# Patient Record
Sex: FEMALE | Race: WHITE
[De-identification: names, ages, dates, MRNs, and addresses within clinical notes are randomized per-mention and may not be internally consistent; named-entity substitution may affect disease eponyms.]

---

## 2018-06-16 ENCOUNTER — HEALTH MAINTENANCE LETTER (OUTPATIENT)
Age: 28
End: 2018-06-16

## 2019-09-09 ENCOUNTER — TRANSFERRED RECORDS (OUTPATIENT)
Dept: HEALTH INFORMATION MANAGEMENT | Facility: CLINIC | Age: 29
End: 2019-09-09

## 2019-09-09 ENCOUNTER — MEDICAL CORRESPONDENCE (OUTPATIENT)
Dept: HEALTH INFORMATION MANAGEMENT | Facility: CLINIC | Age: 29
End: 2019-09-09

## 2019-10-25 ENCOUNTER — TELEPHONE (OUTPATIENT)
Dept: MATERNAL FETAL MEDICINE | Facility: CLINIC | Age: 29
End: 2019-10-25

## 2019-10-25 NOTE — TELEPHONE ENCOUNTER
MFM has attempted to reach patient x4 and left 4 voicemails. Patient has not returned our calls. Called referring provider to inform them. They will attempt to reach out to patient and will let us know if patient declines the appointment.     Patient is past dates for an MFM READ ECHO appt. Will need a peds card appointment should patient call back. Removing READ appt.     Caroline Eduardo

## 2019-11-19 ENCOUNTER — TELEPHONE (OUTPATIENT)
Dept: MATERNAL FETAL MEDICINE | Facility: CLINIC | Age: 29
End: 2019-11-19

## 2019-11-19 NOTE — TELEPHONE ENCOUNTER
MFM received referral from [facility] for L2.  Several calls made to patient, messages were left, and no return call from patient.  Referring clinic notified, removing order.      Mary Lama

## 2019-12-09 ENCOUNTER — HEALTH MAINTENANCE LETTER (OUTPATIENT)
Age: 29
End: 2019-12-09

## 2020-01-24 ENCOUNTER — COMMUNICATION - HEALTHEAST (OUTPATIENT)
Dept: OBGYN | Facility: CLINIC | Age: 30
End: 2020-01-24

## 2020-01-27 ENCOUNTER — ANESTHESIA - HEALTHEAST (OUTPATIENT)
Dept: OBGYN | Facility: CLINIC | Age: 30
End: 2020-01-27

## 2020-01-29 ENCOUNTER — HOME CARE/HOSPICE - HEALTHEAST (OUTPATIENT)
Dept: HOME HEALTH SERVICES | Facility: HOME HEALTH | Age: 30
End: 2020-01-29

## 2020-01-31 ENCOUNTER — HOME CARE/HOSPICE - HEALTHEAST (OUTPATIENT)
Dept: HOME HEALTH SERVICES | Facility: HOME HEALTH | Age: 30
End: 2020-01-31

## 2020-03-15 ENCOUNTER — HEALTH MAINTENANCE LETTER (OUTPATIENT)
Age: 30
End: 2020-03-15

## 2021-01-14 ENCOUNTER — HEALTH MAINTENANCE LETTER (OUTPATIENT)
Age: 31
End: 2021-01-14

## 2021-05-27 VITALS
SYSTOLIC BLOOD PRESSURE: 102 MMHG | RESPIRATION RATE: 18 BRPM | HEART RATE: 82 BPM | TEMPERATURE: 98.3 F | DIASTOLIC BLOOD PRESSURE: 62 MMHG

## 2021-06-05 NOTE — ANESTHESIA PROCEDURE NOTES
Epidural Block    Patient location during procedure: OB  Time Called: 1/27/2020 9:46 AM  Reason for Block:labor epidural  Staffing:  Performing  Anesthesiologist: Marvin Owen MD  Preanesthetic Checklist  Completed: patient identified, risks, benefits, and alternatives discussed, timeout performed, consent obtained, at patient's request, airway assessed, oxygen available, suction available, emergency drugs available and hand hygiene performed  Procedure  Patient position: sitting  Prep: ChloraPrep  Patient monitoring: continuous pulse oximetry, heart rate and blood pressure  Approach: midline  Location: L3-L4  Injection technique: GLENDY saline  Number of Attempts:1  Needle  Needle type: Pulse [employer]   Needle gauge: 18 G     Catheter in Space: 4 (6 cm to EDS)  Assessment  Sensory level:  No complications

## 2021-06-05 NOTE — ANESTHESIA POSTPROCEDURE EVALUATION
Patient: Ana ANDERSEN  Anesthesia type: epidural    Patient location: Labor and Delivery  Last vitals: No vitals data found for the desired time range.    Post vital signs: stable  Level of consciousness: awake and responds to simple questions  Post-anesthesia pain: pain controlled  Post-anesthesia nausea and vomiting: no  Pulmonary: unassisted, return to baseline  Cardiovascular: stable and blood pressure at baseline  Hydration: adequate  Anesthetic events: no    QCDR Measures:  ASA# 11 - Shannan-op Cardiac Arrest: ASA11B - Patient did NOT experience unanticipated cardiac arrest  ASA# 12 - Shannan-op Mortality Rate: ASA12B - Patient did NOT die  ASA# 13 - PACU Re-Intubation Rate: NA - No ETT / LMA used for case  ASA# 10 - Composite Anes Safety: ASA10A - No serious adverse event    Additional Notes:

## 2021-06-05 NOTE — ANESTHESIA PREPROCEDURE EVALUATION
Anesthesia Evaluation      Patient summary reviewed   No history of anesthetic complications     Airway   Mallampati: II  Neck ROM: full   Pulmonary - negative ROS                          Cardiovascular   Exercise tolerance: > or = 4 METS   Neuro/Psych - negative ROS     Endo/Other    (+) obesity, pregnant     GI/Hepatic/Renal - negative ROS           Dental                         Anesthesia Plan  Planned anesthetic: epidural  LE  ASA 2     Anesthetic plan and risks discussed with: patient and spouse    Post-op plan: routine recovery

## 2021-06-05 NOTE — TELEPHONE ENCOUNTER
EDC ; 40.4 weeks;   Pt calls with hx of feeling feverish two days ago, temps around 100, and back aches since then, unable to sleep well last night, now noticing some irregular cramps, not feeling any abdominal tightening.  States fetal movement is normal, and describes lower right abdominal pain intermittently sharp that gets worse with positioning. Informed pt of the need to stay hydrated and to get rest after being sick earlier this week.  Also reassured pt that some normal pains are to be expected with her post dates pregnancy and we discussed the normal s/s of early labor.  Pt encouraged to call her provider to discuss if some sleep medication would be useful.  Pt agrees to call her provider for sleep issues and to call our unit with any plans to come in for evaluation of labor.

## 2021-10-24 ENCOUNTER — HEALTH MAINTENANCE LETTER (OUTPATIENT)
Age: 31
End: 2021-10-24

## 2022-02-13 ENCOUNTER — HEALTH MAINTENANCE LETTER (OUTPATIENT)
Age: 32
End: 2022-02-13

## 2022-10-15 ENCOUNTER — HEALTH MAINTENANCE LETTER (OUTPATIENT)
Age: 32
End: 2022-10-15

## 2023-03-26 ENCOUNTER — HEALTH MAINTENANCE LETTER (OUTPATIENT)
Age: 33
End: 2023-03-26